# Patient Record
(demographics unavailable — no encounter records)

---

## 2024-10-14 NOTE — PROCEDURE
[FreeTextEntry1] : Nucala 300 mg reconstituted solution injected SQ to right and left lower quadrant of abdomen administered by RN.  Lot #[id number] Exp: 12/2027 NDC #0730-9717-62   Pt tolerated injections well. RTC in 4 weeks for next dose.

## 2024-10-14 NOTE — HISTORY OF PRESENT ILLNESS
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 Patient with a hospitalization in 2023 for CHF exacerbation. Patient has been followed by a cardiologist since then. Patient states she has not had hospitalizations since then. Endorses a steroid injection in her back as well as an ablation, endorses some back pain. Denies any concerns with her breathing.    Remains on Breo, has not needed rescue inhaler.  Reports compliance to CPAP which has resolved her daytime sleepiness.  Patient denies any respiratory distress. Patient states she has been compliant with her Breo inhaler daily. Has not required Albuterol rescue in haler. Due for her Nucala injection today.

## 2024-10-14 NOTE — ASSESSMENT
[FreeTextEntry1] : Asthma: Patient to continue with Breo, renewal sent to [facility]. [facility] Patient to receive Nucala injection in office today. Lab work to be performed in office today   Patient to RTC in 6 months.

## 2024-10-14 NOTE — PHYSICAL EXAM
[No Acute Distress] : no acute distress [Normal Oropharynx] : normal oropharynx [Normal Rate/Rhythm] : normal rate/rhythm [Normal S1, S2] : normal s1, s2 [No Resp Distress] : no resp distress [Clear to Auscultation Bilaterally] : clear to auscultation bilaterally [Normal Gait] : normal gait [Oriented x3] : oriented x3 [Normal Appearance] : normal appearance [No Neck Mass] : no neck mass [No Abnormalities] : no abnormalities

## 2024-10-14 NOTE — DISCUSSION/SUMMARY
[FreeTextEntry1] : Pt here for follow up monthly Nucala injection for asthma and EGPA. Continues Breo daily. Pt  is doing well overall, denies SOB, fever, chills, and chest pain at this time.   RTC in 4 weeks for next dose

## 2025-06-18 NOTE — CONSULT LETTER
[Dear  ___] : Dear  [unfilled], [Courtesy Letter:] : I had the pleasure of seeing your patient, [unfilled], in my office today. [Please see my note below.] : Please see my note below. [Sincerely,] : Sincerely, [FreeTextEntry2] : Bryon Butts [FreeTextEntry3] : Hua Bro MD, SD, FACS  Department Otolaryngology Director of [facility] Professor of Otolaryngology,  [employer] [facility]

## 2025-06-18 NOTE — PROCEDURE
[Topical Lidocaine] : topical lidocaine [Oxymetazoline HCl] : oxymetazoline HCl [Image(s) Captured] : image(s) captured and filed [Video Captured] : video captured and filed [Flexible Endoscope] : examined with the flexible endoscope [Serial Number: ___] : Serial Number: [unfilled] [Recalcitrant Symptoms] : recalcitrant symptoms  [Anterior rhinoscopy insufficient to account for symptoms] : anterior rhinoscopy insufficient to account for symptoms [Normal] : the nasopharynx was normal [FreeTextEntry6] : Pre-op indication(s): breathing well Post-op indication(s): breathing well Verbal consent obtained from patient. Anterior rhinoscopy insufficient to account for symptoms  Details for procedure:  Scope #: [id number] Type of scope:    flexible fiber optic telescope      Anesthesia and/or vasoconstriction was achieved topically by usin% Lidocaine spray   0.05% Oxymetazoline     Other ______  The following anatomic sites were directly examined in a sequential fashion:  The scope was introduced in the nasal passage between the middle and inferior turbinates to exam the inferior portion of the middle meatus and the fontanelle, as well as the maxillary ostia. Next, the scope was passed medially and posteriorly to the middle turbinates to examine the sphenoethmoid recess and the superior turbinate region.  Upon visualization the finders are as follows:  Nasal Septum:   Normal   Bleeding site cauterized:    Anterior   left   right   Posterior   left   right  Method:   Silver Nitrate   YAG Laser    Electrocautery ______  Right Side:  * Mucosa: Normal * Mucous: Normal * Polyp: Normal * Inferior Turbinate: Normal * Middle Turbinate: Normal * Superior Turbinate: Normal * Inferior Meatus: Normal * Middle Meatus: Normal * Super Meatus: Normal * Sphenoethmoidal Recess: Normal Left Side:  * Mucosa: Normal * Mucous: Normal * Polyp: Normal * Inferior Turbinate: Normal * Middle Turbinate: Normal * Superior Turbinate: Normal * Inferior Meatus: Normal * Middle Meatus: Normal * Super Meatus: Normal * Sphenoethmoidal Recess: Normal The patient tolerated the procedure well without any complications.

## 2025-06-18 NOTE — HISTORY OF PRESENT ILLNESS
[de-identified] :  59 year old female follow up bilaterally clogged ears. Patient denies otalgia, otorrhea, ear infections, tinnitus, dizziness, vertigo, headaches related to hearing. S/p Septoplasty, turbinectomy, bilateral endoscopic CT_guided nasal polypectomy, ethmoidectomy, maxillary antrostomy with sinus curettage and culture and sensitivity, sphenoidectomy, and frontal sinus exploration with placement of drug_eluting stents 8/26/21. States using Ponaris, Flonase and Azelastine daily. States breathing well.

## 2025-06-18 NOTE — PHYSICAL EXAM
[Nasal Endoscopy Performed] : nasal endoscopy was performed, see procedure section for findings [Midline] : trachea located in midline position [Laryngoscopy Performed] : laryngoscopy was performed, see procedure section for findings [Normal] : no rashes [FreeTextEntry1] : bilaterally clogged ears.  [de-identified] : monolayer in hte left ear.

## 2025-06-18 NOTE — DATA REVIEWED
[de-identified] : 6/18/25: AS: hearing is -2k Hz, mild to severe HL therafter AD: mild to severe SNHL 250-8k Hz.